# Patient Record
Sex: MALE | Race: WHITE
[De-identification: names, ages, dates, MRNs, and addresses within clinical notes are randomized per-mention and may not be internally consistent; named-entity substitution may affect disease eponyms.]

---

## 2017-02-20 ENCOUNTER — HOSPITAL ENCOUNTER (OUTPATIENT)
Dept: HOSPITAL 53 - M SDC | Age: 67
Discharge: HOME | End: 2017-02-20
Attending: OPHTHALMOLOGY
Payer: MEDICARE

## 2017-02-20 VITALS — HEIGHT: 74 IN | WEIGHT: 168 LBS | BODY MASS INDEX: 21.56 KG/M2

## 2017-02-20 VITALS — DIASTOLIC BLOOD PRESSURE: 59 MMHG | SYSTOLIC BLOOD PRESSURE: 110 MMHG

## 2017-02-20 DIAGNOSIS — I25.10: ICD-10-CM

## 2017-02-20 DIAGNOSIS — K21.9: ICD-10-CM

## 2017-02-20 DIAGNOSIS — I35.9: ICD-10-CM

## 2017-02-20 DIAGNOSIS — H04.423: ICD-10-CM

## 2017-02-20 DIAGNOSIS — F17.210: ICD-10-CM

## 2017-02-20 DIAGNOSIS — I71.2: ICD-10-CM

## 2017-02-20 DIAGNOSIS — Z79.899: ICD-10-CM

## 2017-02-20 DIAGNOSIS — Z95.2: ICD-10-CM

## 2017-02-20 DIAGNOSIS — H04.203: Primary | ICD-10-CM

## 2017-02-20 DIAGNOSIS — Z86.73: ICD-10-CM

## 2017-02-20 PROCEDURE — 68801 DILATE TEAR DUCT OPENING: CPT

## 2017-02-20 PROCEDURE — 68810 PROBE NASOLACRIMAL DUCT: CPT

## 2017-02-21 NOTE — RO
DATE OF PROCEDURE:  02/20/2017

 

PREPROCEDURE DIAGNOSIS:    Bilateral epiphora, both eyes.

 

POSTPROCEDURE DIAGNOSIS:   Bilateral epiphora, both eyes.

 

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

 

PROCEDURE:  Bilateral probe and irrigation of the upper and lower lacrimal

systems of both eyes.

 

SURGEON:  Tramaine Rodriguez DO

 

ASSISTANT:

 

ANESTHESIA:  Local 2% lidocaine with epinephrine was infused in the medial

canthal region of both eyes.

 

ESTIMATED BLOOD LOSS:  Minimal.

 

COMPLICATIONS:  None; however, the right upper canalicular system was blocked and

no flow could be established.

 

DESCRIPTION OF PROCEDURE:    After taking informed consent, the patient was taken

to the operating room and the medial canthal regions were prepped.  Some local

lidocaine with 2% epinephrine was infused in the medial canthal region 3/10 of a

mL, both eyes. The upper and lower systems of the lacrimal drainage canals were

dilated, intubated and irrigated with balance salt solution, and then Maxitrol

ophthalmic solution after establishing flow with the BSS.  The right upper system

no flow was established, it is blocked.  The patient was returned to the recovery

room in excellent condition.  He will use Ofloxacin in both eyes four times a day

and will followup in the office.

## 2017-03-24 ENCOUNTER — HOSPITAL ENCOUNTER (OUTPATIENT)
Dept: HOSPITAL 53 - M RAD | Age: 67
End: 2017-03-24
Attending: OPHTHALMOLOGY
Payer: MEDICARE

## 2017-03-24 ENCOUNTER — HOSPITAL ENCOUNTER (OUTPATIENT)
Dept: HOSPITAL 53 - M CARPUL | Age: 67
End: 2017-03-24
Attending: OPHTHALMOLOGY
Payer: MEDICARE

## 2017-03-24 DIAGNOSIS — G45.3: Primary | ICD-10-CM

## 2017-03-24 NOTE — REP
CAROTID ULTRASOUND:

 

Real-time ultrasound evaluation and duplex Doppler interrogation of the

extracranial carotid vasculature is performed.  There is mild plaquing and

narrowing in both carotid bulbs extending into the internal and external carotid

arteries.  Luminal narrowing is less than 50%.  There is no evidence of

hemodynamically significant stenosis of either internal carotid artery.  Normal

flow velocities are seen. The vertebral arteries demonstrate normal direction of

flow.

 

                                                                RIGHT

LEFT

 

Peak systolic velocity ICA                    58.0 cm/s                      86.6

cm/s

 

End diastolic velocity ICA                  22 cm/s                      39.3

cm/s

 

Peak systolic velocity CCA                  88.4 cm/s                     83

cm/s

 

Peak systolic velocity ECA                  83.1 cm/s                     87.5

cm/s

 

ICA/CCA ratio                              0.66

1.04

 

IMPRESSION:

 

Bilateral luminal narrowing of the internal carotid arteries less than 50%.  No

evidence of hemodynamically significant stenosis.

 

 

Signed by

Kingsley Angulo MD 03/24/2017 12:38 P

## 2017-03-25 NOTE — ECHO
DATE OF SERVICE:  03/24/2017

 

REFERRING PROVIDER:  Dr. Tramaine Rodriguez

 

PATIENT LOCATION:  Outpatient.

 

REASON FOR THE ECHOCARDIOGRAM:  Heart murmur.

 

2D MEASUREMENTS:

IVS:  1.2 cm

LV:  4.6 cm

LVPW:  1.2 cm

LA:  3.9 cm

Aorta:  4.0 cm

IVC:  2.1 cm

Ascending aorta:  3.8

 

DOPPLER MEASUREMENTS:

Peak velocity across the aortic valve:  2.5 m/s

Peak velocity across the LVOT:  0.99 m/s

Mitral E:  0.57

Maximum tricuspid valve velocity:  2.0 m/s

 

2D COMMENTS:

1.  Normal left ventricular size with probably mildly increased left ventricular

wall thickness. Left ventricular systolic function is normal, estimated at 65%.

2.  Normal left atrium. Normal right atrium and right ventricle.

3.  The atrial septum appeared to be normal without evidence of defect or shunt.

4.  Mildly enlarged aortic root and ascending aorta at 4.0 cm and 3.8 cm,

respectively.

5.  No pericardial effusion seen.

6.  Bioprosthetic valve noted in the aortic valve position, leaflet excursion

appeared to be normal. Mildly calcified mitral annulus with normal anterior

mitral valve leaflet motion. Normal tricuspid valve. The pulmonic valve and

proximal pulmonary artery branches appear to be normal in limited views.

7.  The inferior vena cava was mildly enlarged, central venous pressure mildly

elevated.

 

DOPPLER:

It detects trace to mild mitral regurgitation and mild to moderate tricuspid

regurgitation. The calculated pulmonary artery systolic pressure, however, was

normal, probably underestimated. Trace pulmonic regurgitation also detected.

 

IMPRESSION:

1.  Normal global left ventricular systolic function with probably mild

concentric left ventricular hypertrophy.

2.  Bioprosthetic aortic valve with probably mild aortic stenosis, but no aortic

regurgitation.

3.  Mitral annulus calcification with trace to mild mitral regurgitation.

4.  Mild to moderate tricuspid regurgitation. Calculated pulmonary artery

systolic pressure was normal, probably underestimated.

5.  Mildly dilated aortic root and ascending aorta at 4.0 cm and 3.8 cm,

respectively.

MTDD

## 2017-03-30 ENCOUNTER — HOSPITAL ENCOUNTER (OUTPATIENT)
Dept: HOSPITAL 53 - M LABDRWCV | Age: 67
End: 2017-03-30
Attending: OPHTHALMOLOGY
Payer: MEDICARE

## 2017-03-30 DIAGNOSIS — Z79.899: ICD-10-CM

## 2017-03-30 DIAGNOSIS — G45.3: Primary | ICD-10-CM

## 2017-03-30 LAB
CHOLEST SERPL-MCNC: 217 MG/DL (ref ?–200)
ERYTHROCYTE [DISTWIDTH] IN BLOOD BY AUTOMATED COUNT: 12.7 % (ref 11.5–14.5)
ERYTHROCYTE [SEDIMENTATION RATE] IN BLOOD BY WESTERGREN METHOD: 5 MM/HR (ref 0–20)
EST. AVERAGE GLUCOSE BLD GHB EST-MCNC: 114 MG/DL (ref 60–110)
MCH RBC QN AUTO: 32.1 PG (ref 27–33)
MCHC RBC AUTO-ENTMCNC: 33 G/DL (ref 32–36.5)
MCV RBC AUTO: 97 FL (ref 80–96)
PLATELET # BLD AUTO: 170 K/MM3 (ref 150–450)
TRIGL SERPL-MCNC: 61 MG/DL (ref ?–150)
WBC # BLD AUTO: 5.5 K/MM3 (ref 4–10)

## 2017-05-30 ENCOUNTER — HOSPITAL ENCOUNTER (OUTPATIENT)
Dept: HOSPITAL 53 - M SDC | Age: 67
Discharge: HOME | End: 2017-05-30
Attending: OPHTHALMOLOGY
Payer: MEDICARE

## 2017-05-30 VITALS — BODY MASS INDEX: 20.53 KG/M2 | HEIGHT: 74 IN | WEIGHT: 160 LBS

## 2017-05-30 VITALS — DIASTOLIC BLOOD PRESSURE: 85 MMHG | SYSTOLIC BLOOD PRESSURE: 147 MMHG

## 2017-05-30 DIAGNOSIS — G20: ICD-10-CM

## 2017-05-30 DIAGNOSIS — Z79.899: ICD-10-CM

## 2017-05-30 DIAGNOSIS — H57.03: ICD-10-CM

## 2017-05-30 DIAGNOSIS — H26.9: Primary | ICD-10-CM

## 2017-05-30 DIAGNOSIS — Z87.81: ICD-10-CM

## 2017-05-30 DIAGNOSIS — Z95.2: ICD-10-CM

## 2017-05-30 DIAGNOSIS — Z86.73: ICD-10-CM

## 2017-05-30 DIAGNOSIS — T88.59XD: ICD-10-CM

## 2017-05-30 DIAGNOSIS — R29.898: ICD-10-CM

## 2017-05-30 DIAGNOSIS — Z86.79: ICD-10-CM

## 2017-05-30 DIAGNOSIS — M12.9: ICD-10-CM

## 2017-05-30 DIAGNOSIS — F17.210: ICD-10-CM

## 2017-05-30 DIAGNOSIS — K21.9: ICD-10-CM

## 2017-05-30 DIAGNOSIS — Z85.828: ICD-10-CM

## 2017-05-30 DIAGNOSIS — F41.9: ICD-10-CM

## 2017-05-30 PROCEDURE — 66982 XCAPSL CTRC RMVL CPLX WO ECP: CPT

## 2017-05-30 NOTE — RO
DATE OF PROCEDURE:  05/30/2017

 

PREPROCEDURE DIAGNOSIS: Cataract left eye.

 

POSTPROCEDURE DIAGNOSIS: Cataract left eye as well as miosis left eye.

 

PROCEDURE: Phacoemulsification with intraocular lens implantation PCB00, +19.5

diopters and placement of the Malyugin Ring , which was 7 mm.

 

SURGEON: Dr. Jayna Rob

 

ASSISTANT: None.

 

ANESTHESIA:

 

COMPLICATIONS:  None.

 

DESCRIPTION OF PROCEDURE:  The patient was brought to the operating room and laid

in supine position. The eye was prepped and draped in a sterile fashion for

ophthalmic surgery and a lid speculum was placed. Sideport incision was made and

EndoCoat was injected into the anterior chamber. Temporal clear corneal incision

was then made with a 2.5 mm keratome followed by placement of the Malyugin Ring

to dilate the pupil. After this was done, capsulorrhexis was done followed by

hydrodissection with balanced salt solution.  Lens was rotated within the capsule

and then phacoemulsification was done in a divide and conquer method within the

capsular bag. Excess cortical material was then aspirated using irrigation and

aspiration cannula. After that, Healon was injected into the capsular bag and

intraocular lens PCB00, +19.5 inserted in the capsular bag. Excess viscoelastic

was aspirated, wound was hydrated. Intracameral moxifloxacin and sub-Tenon

Kenalog injection were given, lid speculum removed and patient returned to the

recovery room in stable condition.

 

Prior to removing the excess Healon, I forgot to mention that Malyugin Ring was

also removed.

## 2017-06-19 ENCOUNTER — HOSPITAL ENCOUNTER (OUTPATIENT)
Dept: HOSPITAL 53 - M SDC | Age: 67
Discharge: HOME | End: 2017-06-19
Attending: OPHTHALMOLOGY
Payer: MEDICARE

## 2017-06-19 VITALS — BODY MASS INDEX: 20.53 KG/M2 | WEIGHT: 160 LBS | HEIGHT: 74 IN

## 2017-06-19 VITALS — DIASTOLIC BLOOD PRESSURE: 60 MMHG | SYSTOLIC BLOOD PRESSURE: 127 MMHG

## 2017-06-19 DIAGNOSIS — Z79.899: ICD-10-CM

## 2017-06-19 DIAGNOSIS — Z86.73: ICD-10-CM

## 2017-06-19 DIAGNOSIS — T88.59XD: ICD-10-CM

## 2017-06-19 DIAGNOSIS — F17.210: ICD-10-CM

## 2017-06-19 DIAGNOSIS — Z95.4: ICD-10-CM

## 2017-06-19 DIAGNOSIS — H26.9: Primary | ICD-10-CM

## 2017-06-19 DIAGNOSIS — Z85.828: ICD-10-CM

## 2017-06-19 DIAGNOSIS — Z87.81: ICD-10-CM

## 2017-06-19 DIAGNOSIS — F41.9: ICD-10-CM

## 2017-06-19 DIAGNOSIS — M12.9: ICD-10-CM

## 2017-06-19 DIAGNOSIS — H57.03: ICD-10-CM

## 2017-06-19 PROCEDURE — 66982 XCAPSL CTRC RMVL CPLX WO ECP: CPT

## 2018-02-22 ENCOUNTER — HOSPITAL ENCOUNTER (OUTPATIENT)
Dept: HOSPITAL 53 - M LABDRWCV | Age: 68
End: 2018-02-22
Attending: FAMILY MEDICINE
Payer: MEDICARE

## 2018-02-22 DIAGNOSIS — Z95.2: ICD-10-CM

## 2018-02-22 DIAGNOSIS — Z48.89: Primary | ICD-10-CM

## 2018-02-22 DIAGNOSIS — Z79.899: ICD-10-CM

## 2018-02-22 LAB
ALBUMIN/GLOBULIN RATIO: 1.41 (ref 1–1.93)
ALBUMIN: 4.1 GM/DL (ref 3.2–5.2)
ALKALINE PHOSPHATASE: 59 U/L (ref 45–117)
ALT SERPL W P-5'-P-CCNC: 21 U/L (ref 12–78)
ANION GAP: 7 MEQ/L (ref 8–16)
AST SERPL-CCNC: 15 U/L (ref 7–37)
BASO #: 0 10^3/UL (ref 0–0.2)
BASO %: 0.6 % (ref 0–1)
BILIRUBIN,TOTAL: 0.4 MG/DL (ref 0.2–1)
BLOOD UREA NITROGEN: 28 MG/DL (ref 7–18)
CALCIUM LEVEL: 8.7 MG/DL (ref 8.8–10.2)
CARBON DIOXIDE LEVEL: 27 MEQ/L (ref 21–32)
CHLORIDE LEVEL: 109 MEQ/L (ref 98–107)
CHOLEST SERPL-MCNC: 232 MG/DL (ref ?–200)
CHOLESTEROL RISK RATIO: 2.83 (ref ?–5)
CREATININE FOR GFR: 1 MG/DL (ref 0.7–1.3)
EOS #: 0.1 10^3/UL (ref 0–0.5)
EOSINOPHIL NFR BLD AUTO: 1.7 % (ref 0–3)
GFR SERPL CREATININE-BSD FRML MDRD: > 60 ML/MIN/{1.73_M2} (ref 49–?)
GLUCOSE, FASTING: 90 MG/DL (ref 70–100)
HDLC SERPL-MCNC: 82 MG/DL (ref 40–?)
HEMATOCRIT: 42.3 % (ref 42–52)
HEMOGLOBIN: 13.9 G/DL (ref 14–18)
IMMATURE GRANULOCYTE %: 0.6 % (ref 0–3)
LDL CHOLESTEROL: 139.2 MG/DL (ref ?–100)
LYMPH #: 1.8 10^3/UL (ref 1.5–4.5)
LYMPH %: 25.6 % (ref 24–44)
MEAN CORPUSCULAR HEMOGLOBIN: 32.2 PG (ref 27–33)
MEAN CORPUSCULAR HGB CONC: 32.9 G/DL (ref 32–36.5)
MEAN CORPUSCULAR VOLUME: 97.9 FL (ref 80–96)
MONO #: 0.8 10^3/UL (ref 0–0.8)
MONO %: 11.8 % (ref 0–5)
NEUTROPHILS #: 4.2 10^3/UL (ref 1.8–7.7)
NEUTROPHILS %: 59.7 % (ref 36–66)
NONHDLC SERPL-MCNC: 150 MG/DL
NRBC BLD AUTO-RTO: 0 % (ref 0–0)
PLATELET COUNT, AUTOMATED: 174 10^3/UL (ref 150–450)
POTASSIUM SERUM: 4 MEQ/L (ref 3.5–5.1)
RED BLOOD COUNT: 4.32 10^6/UL (ref 4.3–6.1)
RED CELL DISTRIBUTION WIDTH: 12.8 % (ref 11.5–14.5)
SODIUM LEVEL: 143 MEQ/L (ref 136–145)
TOTAL PROTEIN: 7 GM/DL (ref 6.4–8.2)
TRIGLYCERIDES LEVEL: 54 MG/DL (ref ?–150)
WHITE BLOOD COUNT: 7.1 10^3/UL (ref 4–10)

## 2018-02-22 PROCEDURE — 80053 COMPREHEN METABOLIC PANEL: CPT

## 2018-05-09 ENCOUNTER — HOSPITAL ENCOUNTER (OUTPATIENT)
Dept: HOSPITAL 53 - M OPP | Age: 68
Discharge: HOME | End: 2018-05-09
Attending: SURGERY
Payer: MEDICARE

## 2018-05-09 DIAGNOSIS — Z80.3: ICD-10-CM

## 2018-05-09 DIAGNOSIS — F32.9: ICD-10-CM

## 2018-05-09 DIAGNOSIS — M19.90: ICD-10-CM

## 2018-05-09 DIAGNOSIS — G47.30: ICD-10-CM

## 2018-05-09 DIAGNOSIS — Z95.4: ICD-10-CM

## 2018-05-09 DIAGNOSIS — F17.210: ICD-10-CM

## 2018-05-09 DIAGNOSIS — Z85.828: ICD-10-CM

## 2018-05-09 DIAGNOSIS — I63.9: ICD-10-CM

## 2018-05-09 DIAGNOSIS — E78.00: ICD-10-CM

## 2018-05-09 DIAGNOSIS — K92.1: ICD-10-CM

## 2018-05-09 DIAGNOSIS — R01.1: ICD-10-CM

## 2018-05-09 DIAGNOSIS — Z87.442: ICD-10-CM

## 2018-05-09 DIAGNOSIS — I35.9: ICD-10-CM

## 2018-05-09 DIAGNOSIS — I71.4: ICD-10-CM

## 2018-05-09 DIAGNOSIS — D12.5: ICD-10-CM

## 2018-05-09 DIAGNOSIS — K57.30: ICD-10-CM

## 2018-05-09 DIAGNOSIS — Z80.8: ICD-10-CM

## 2018-05-09 DIAGNOSIS — M10.9: ICD-10-CM

## 2018-05-09 DIAGNOSIS — K21.9: ICD-10-CM

## 2018-05-09 DIAGNOSIS — R06.83: ICD-10-CM

## 2018-05-09 DIAGNOSIS — Z12.11: Primary | ICD-10-CM

## 2018-05-09 DIAGNOSIS — Z86.010: ICD-10-CM

## 2018-05-09 DIAGNOSIS — F41.9: ICD-10-CM

## 2018-05-09 PROCEDURE — 45385 COLONOSCOPY W/LESION REMOVAL: CPT

## 2018-05-09 RX ADMIN — SODIUM CHLORIDE 1 MLS/HR: 9 INJECTION, SOLUTION INTRAVENOUS at 10:00

## 2019-10-09 ENCOUNTER — HOSPITAL ENCOUNTER (EMERGENCY)
Dept: HOSPITAL 53 - M ED | Age: 69
Discharge: HOME | End: 2019-10-09
Payer: MEDICARE

## 2019-10-09 VITALS — HEIGHT: 73 IN | WEIGHT: 160.34 LBS | BODY MASS INDEX: 21.25 KG/M2

## 2019-10-09 VITALS — SYSTOLIC BLOOD PRESSURE: 119 MMHG | DIASTOLIC BLOOD PRESSURE: 66 MMHG

## 2019-10-09 DIAGNOSIS — Z79.899: ICD-10-CM

## 2019-10-09 DIAGNOSIS — I44.4: Primary | ICD-10-CM

## 2019-10-09 DIAGNOSIS — F41.0: ICD-10-CM

## 2019-10-09 DIAGNOSIS — F17.200: ICD-10-CM

## 2019-10-09 LAB
ALBUMIN SERPL BCG-MCNC: 3.8 GM/DL (ref 3.2–5.2)
ALT SERPL W P-5'-P-CCNC: 18 U/L (ref 12–78)
BASOPHILS # BLD AUTO: 0 10^3/UL (ref 0–0.2)
BASOPHILS NFR BLD AUTO: 0.3 % (ref 0–1)
BILIRUB CONJ SERPL-MCNC: 0.2 MG/DL (ref 0–0.2)
BILIRUB SERPL-MCNC: 0.7 MG/DL (ref 0.2–1)
BUN SERPL-MCNC: 24 MG/DL (ref 7–18)
CALCIUM SERPL-MCNC: 9 MG/DL (ref 8.8–10.2)
CHLORIDE SERPL-SCNC: 105 MEQ/L (ref 98–107)
CK MB CFR.DF SERPL CALC: 1.26
CK MB SERPL-MCNC: 1.5 NG/ML (ref ?–3.6)
CK SERPL-CCNC: 119 U/L (ref 39–308)
CO2 SERPL-SCNC: 28 MEQ/L (ref 21–32)
CREAT SERPL-MCNC: 1.24 MG/DL (ref 0.7–1.3)
EOSINOPHIL # BLD AUTO: 0 10^3/UL (ref 0–0.5)
EOSINOPHIL NFR BLD AUTO: 0.2 % (ref 0–3)
GFR SERPL CREATININE-BSD FRML MDRD: > 60 ML/MIN/{1.73_M2} (ref 49–?)
GLUCOSE SERPL-MCNC: 97 MG/DL (ref 70–100)
HCT VFR BLD AUTO: 40.9 % (ref 42–52)
HGB BLD-MCNC: 13.8 G/DL (ref 13.5–17.5)
LYMPHOCYTES # BLD AUTO: 0.8 10^3/UL (ref 1.5–5)
LYMPHOCYTES NFR BLD AUTO: 7 % (ref 24–44)
MCH RBC QN AUTO: 33 PG (ref 27–33)
MCHC RBC AUTO-ENTMCNC: 33.7 G/DL (ref 32–36.5)
MCV RBC AUTO: 97.8 FL (ref 80–96)
MONOCYTES # BLD AUTO: 0.8 10^3/UL (ref 0–0.8)
MONOCYTES NFR BLD AUTO: 7.1 % (ref 0–5)
NEUTROPHILS # BLD AUTO: 10.1 10^3/UL (ref 1.5–8.5)
NEUTROPHILS NFR BLD AUTO: 85 % (ref 36–66)
NT-PRO BNP: 216 PG/ML (ref ?–125)
PLATELET # BLD AUTO: 171 10^3/UL (ref 150–450)
POTASSIUM SERPL-SCNC: 4.1 MEQ/L (ref 3.5–5.1)
PROT SERPL-MCNC: 6.7 GM/DL (ref 6.4–8.2)
RBC # BLD AUTO: 4.18 10^6/UL (ref 4.3–6.1)
SODIUM SERPL-SCNC: 139 MEQ/L (ref 136–145)
TROPONIN I SERPL-MCNC: < 0.02 NG/ML (ref ?–0.1)
TSH SERPL DL<=0.005 MIU/L-ACNC: 0.65 UIU/ML (ref 0.36–3.74)
WBC # BLD AUTO: 11.8 10^3/UL (ref 4–10)

## 2019-10-09 NOTE — REP
CHEST, TWO VIEWS:

 

Two views of the chest are performed. Rounded lipoma again projects over the

right chest superiorly. No acute infiltrate is seen. The heart is normal in size.

The mediastinal silhouette is unchanged. Multiple sternal wires are present as

well as a prosthetic heart valve. There are mild degenerative changes of the

spine.

 

IMPRESSION:

 

No acute pulmonary disease.

 

 

Electronically Signed by

Kingsley Angulo MD 10/09/2019 06:47 P

## 2019-10-10 NOTE — ECGEPIP
[facility] - ED

                                       

                                       Test Date:    2019-10-09

Pat Name:     REGGIE FERGUSON        Department:   

Patient ID:   [id number]                 Room:         -

Gender:       Male                     Technician:   JLOUIE

:          1950               Requested By: Radha Villegas 

Order Number: [id number]     Reading MD:   Radha Villegas

                                 Measurements

Intervals                              Axis          

Rate:         67                       P:            5

VT:           165                      QRS:          -60

QRSD:         124                      T:            60

QT:           387                                    

QTc:          409                                    

                           Interpretive Statements

SINUS RHYTHM WITH FREQUENT SUPRAVENTRICULAR PREMATURE COMPLEXES

POSSIBLE RIGHT VENTRICULAR CONDUCTION DELAY

LEFT ANTERIOR FASCICULAR BLOCK

SEPTAL MYOCARDIAL INFARCTION, OF INDETERMINATE AGE

NO PRIOR

Electronically Signed on 10- 10:50:54 EDT by Radha Villegas